# Patient Record
Sex: MALE | Race: BLACK OR AFRICAN AMERICAN | NOT HISPANIC OR LATINO | Employment: FULL TIME | URBAN - METROPOLITAN AREA
[De-identification: names, ages, dates, MRNs, and addresses within clinical notes are randomized per-mention and may not be internally consistent; named-entity substitution may affect disease eponyms.]

---

## 2018-09-14 ENCOUNTER — CLINICAL SUPPORT (OUTPATIENT)
Dept: URGENT CARE | Facility: CLINIC | Age: 53
End: 2018-09-14

## 2018-09-14 DIAGNOSIS — Z02.1 PRE-EMPLOYMENT DRUG SCREENING: Primary | ICD-10-CM

## 2018-09-14 LAB
CTP QC/QA: YES
POC 10 PANEL DRUG SCREEN: NEGATIVE

## 2018-09-14 PROCEDURE — 80305 DRUG TEST PRSMV DIR OPT OBS: CPT | Mod: QW,S$GLB,, | Performed by: NURSE PRACTITIONER

## 2018-09-14 NOTE — PROGRESS NOTES
Subjective:       Patient ID: Tigre Garcia is a 52 y.o. male.    Vitals:  vitals were not taken for this visit.     Chief Complaint: No chief complaint on file.    Patient is here for pre employment drug testing       ROS    Objective:      Physical Exam    Assessment:       No diagnosis found.    Plan:         There are no diagnoses linked to this encounter.

## 2019-02-02 ENCOUNTER — HOSPITAL ENCOUNTER (INPATIENT)
Facility: HOSPITAL | Age: 54
LOS: 1 days | Discharge: HOME | DRG: 316 | End: 2019-02-03
Attending: INTERNAL MEDICINE | Admitting: HOSPITALIST

## 2019-02-02 DIAGNOSIS — I31.39 PERICARDIAL EFFUSION: ICD-10-CM

## 2019-02-02 DIAGNOSIS — I30.9 ACUTE PERICARDITIS, UNSPECIFIED TYPE: ICD-10-CM

## 2019-02-02 DIAGNOSIS — R79.89 ELEVATED D-DIMER: ICD-10-CM

## 2019-02-02 DIAGNOSIS — I31.9 PERICARDITIS: ICD-10-CM

## 2019-02-02 LAB
CRP SERPL-MCNC: 234.1 MG/L
ERYTHROCYTE [SEDIMENTATION RATE] IN BLOOD BY WESTERGREN METHOD: 120 MM/HR
TROPONIN I SERPL DL<=0.01 NG/ML-MCNC: 0.01 NG/ML

## 2019-02-02 PROCEDURE — 85652 RBC SED RATE AUTOMATED: CPT

## 2019-02-02 PROCEDURE — 84484 ASSAY OF TROPONIN QUANT: CPT | Mod: 91

## 2019-02-02 PROCEDURE — 93005 ELECTROCARDIOGRAM TRACING: CPT

## 2019-02-02 PROCEDURE — 99223 1ST HOSP IP/OBS HIGH 75: CPT | Mod: ,,, | Performed by: HOSPITALIST

## 2019-02-02 PROCEDURE — 20600001 HC STEP DOWN PRIVATE ROOM

## 2019-02-02 PROCEDURE — 36415 COLL VENOUS BLD VENIPUNCTURE: CPT

## 2019-02-02 PROCEDURE — 86140 C-REACTIVE PROTEIN: CPT

## 2019-02-02 PROCEDURE — 93010 EKG 12-LEAD: ICD-10-PCS | Mod: ,,, | Performed by: INTERNAL MEDICINE

## 2019-02-02 PROCEDURE — 93010 ELECTROCARDIOGRAM REPORT: CPT | Mod: ,,, | Performed by: INTERNAL MEDICINE

## 2019-02-02 PROCEDURE — 99223 PR INITIAL HOSPITAL CARE,LEVL III: ICD-10-PCS | Mod: ,,, | Performed by: HOSPITALIST

## 2019-02-02 PROCEDURE — 25000003 PHARM REV CODE 250: Performed by: HOSPITALIST

## 2019-02-02 RX ORDER — SODIUM CHLORIDE 0.9 % (FLUSH) 0.9 %
5 SYRINGE (ML) INJECTION
Status: DISCONTINUED | OUTPATIENT
Start: 2019-02-02 | End: 2019-02-03 | Stop reason: HOSPADM

## 2019-02-02 RX ORDER — ACETAMINOPHEN 325 MG/1
650 TABLET ORAL EVERY 8 HOURS PRN
Status: DISCONTINUED | OUTPATIENT
Start: 2019-02-02 | End: 2019-02-03 | Stop reason: HOSPADM

## 2019-02-02 RX ORDER — PANTOPRAZOLE SODIUM 40 MG/1
40 TABLET, DELAYED RELEASE ORAL DAILY
Status: DISCONTINUED | OUTPATIENT
Start: 2019-02-03 | End: 2019-02-03 | Stop reason: HOSPADM

## 2019-02-02 RX ORDER — IBUPROFEN 400 MG/1
800 TABLET ORAL 3 TIMES DAILY
Status: DISCONTINUED | OUTPATIENT
Start: 2019-02-02 | End: 2019-02-03 | Stop reason: HOSPADM

## 2019-02-02 RX ORDER — IBUPROFEN 200 MG
24 TABLET ORAL
Status: DISCONTINUED | OUTPATIENT
Start: 2019-02-02 | End: 2019-02-03

## 2019-02-02 RX ORDER — IBUPROFEN 200 MG
16 TABLET ORAL
Status: DISCONTINUED | OUTPATIENT
Start: 2019-02-02 | End: 2019-02-03

## 2019-02-02 RX ORDER — RAMELTEON 8 MG/1
8 TABLET ORAL NIGHTLY PRN
Status: DISCONTINUED | OUTPATIENT
Start: 2019-02-02 | End: 2019-02-03 | Stop reason: HOSPADM

## 2019-02-02 RX ORDER — GLUCAGON 1 MG
1 KIT INJECTION
Status: DISCONTINUED | OUTPATIENT
Start: 2019-02-02 | End: 2019-02-03

## 2019-02-02 RX ORDER — ONDANSETRON 8 MG/1
8 TABLET, ORALLY DISINTEGRATING ORAL EVERY 8 HOURS PRN
Status: DISCONTINUED | OUTPATIENT
Start: 2019-02-02 | End: 2019-02-03 | Stop reason: HOSPADM

## 2019-02-02 RX ORDER — AMOXICILLIN 250 MG
1 CAPSULE ORAL 2 TIMES DAILY PRN
Status: DISCONTINUED | OUTPATIENT
Start: 2019-02-02 | End: 2019-02-03 | Stop reason: HOSPADM

## 2019-02-02 RX ORDER — COLCHICINE 0.6 MG/1
0.6 TABLET, FILM COATED ORAL DAILY
Status: DISCONTINUED | OUTPATIENT
Start: 2019-02-02 | End: 2019-02-03 | Stop reason: HOSPADM

## 2019-02-02 RX ADMIN — COLCHICINE 0.6 MG: 0.6 TABLET, FILM COATED ORAL at 09:02

## 2019-02-02 RX ADMIN — IBUPROFEN 800 MG: 400 TABLET, FILM COATED ORAL at 09:02

## 2019-02-02 NOTE — PLAN OF CARE
"Upon the patient's arrival to the floor, please call extension [phone] STAT for Hospital Medicine admit team assignment and for additional admit orders (if nobody answers, press the number 1 option, and this will flip to a beeper or spectra-link, so put in your call back number).  Do not page the attending staff physician associated with the patient in EPIC (they may not be in-house at the time of arrival).  Rather, always call [phone] to reach the triage physician for orders and team assignment.  If you have received no call back after 20 minutes, try again.  If you have not reached Hospital Medicine triage coordinator after two tries, please escalate to charge nurse in order to page the doctor on call for Hospital Medicine in general.         Outside Transfer Acceptance Note     Patients name:  Emanuel Garcia, MRN [id number]      Transferring Physician or Mid-Level provider/Clinic giving report:   Dr. Jose Giang at [facility] ED    Accepting Physician for admission to hospital: Prasanna Mckeon M.D.     Consulting Physicians: Dr. Pate, Cardiology    Acceptance date:  02/02/2019    Reason for transfer:   Acute pericarditis with pericardial effusion    Report from Physician/Mid-Level Provider:    Chief Complaint   Patient presents with    Chest Pain       C/o midsternal chest pain, nonradiating x 2 weeeks. Intermittent in nature. + cough as well. Denies SOB. Symptoms somewhat better with OTC theraflu and cold/flu medications.     "This patient is a 53-year-old male.  Presents to the emergency department complaining of chest pain.  Has been persistent for the last 2 weeks.  The pain is in the retrosternal region, but he said it only hurts when he lies down flat, or when he lies on his right side or left side while he is in bed.  As soon as he gets up, the pain goes away.  Does not have pain when he exerts himself for walks.  No shortness of breath or palpitations.  He has had some chills and sweats, " "and said that he feels like he has had URI symptoms for the last week.  He had congestion, cough, productive of clear sputum.  No hemoptysis.  Has been taking over-the-counter TheraFlu without relief.  No nausea vomiting or diarrhea.  No rash. No history of heart disease."    Per sending MD, he appears very stable currently with good BP and P.  His Echo did show some RV collapse with diastole, so will need to monitor closely for tamponade signs and symptoms, though currently he has no JVD or hypotension.      To Do List upon arrival:    Stat cardiology consult with bedside echo  Case discussed with Dr. Pate  Got Solumedrol 125mg iv in ED and Toradol iv  Tele, CCU consult with any signs of tamponade.    CLINICAL REVIEW:  Past Medical History:  None    Review of patient's allergies indicates:  No Known Allergies    Vital Signs:  Temp:  [99.2 °F (37.3 °C)] 99.2 °F (37.3 °C)  Pulse:  [85-99] 99  Resp:  [15-27] 27  SpO2:  [96 %] 96 %  BP: (132-143)/(80-90) 140/88    LABS:  Recent Results (from the past 24 hour(s))   CBC auto differential    Collection Time: 02/02/19 10:05 AM   Result Value Ref Range    WBC 8.40 3.90 - 12.70 K/uL    RBC 3.62 (L) 4.60 - 6.20 M/uL    Hemoglobin 12.0 (L) 14.0 - 18.0 g/dL    Hematocrit 36.1 (L) 40.0 - 54.0 %     (H) 82 - 98 fL    MCH 33.2 (H) 27.0 - 31.0 pg    MCHC 33.4 32.0 - 36.0 g/dL    RDW 12.1 11.5 - 14.5 %    Platelets 357 (H) 150 - 350 K/uL    MPV 8.0 (L) 9.2 - 12.9 fL    Gran # (ANC) 6.3 1.8 - 7.7 K/uL    Lymph # 1.4 1.0 - 4.8 K/uL    Mono # 0.6 0.3 - 1.0 K/uL    Eos # 0.1 0.0 - 0.5 K/uL    Baso # 0.10 0.00 - 0.20 K/uL    Gran% 74.8 (H) 38.0 - 73.0 %    Lymph% 16.1 (L) 18.0 - 48.0 %    Mono% 7.7 4.0 - 15.0 %    Eosinophil% 0.7 0.0 - 8.0 %    Basophil% 0.7 0.0 - 1.9 %    Differential Method Automated    Comprehensive metabolic panel    Collection Time: 02/02/19 10:05 AM   Result Value Ref Range    Sodium 135 (L) 136 - 145 mmol/L    Potassium 4.0 3.5 - 5.1 mmol/L    Chloride " 103 101 - 111 mmol/L    CO2 25 23 - 29 mmol/L    Glucose 93 74 - 118 mg/dL    BUN, Bld 11 6 - 20 mg/dL    Creatinine 0.9 0.5 - 1.4 mg/dL    Calcium 8.9 8.6 - 10.0 mg/dL    Total Protein 8.5 (H) 6.0 - 8.4 g/dL    Albumin 3.2 (L) 3.5 - 5.2 g/dL    Total Bilirubin 1.1 0.3 - 1.2 mg/dL    Alkaline Phosphatase 61 38 - 126 U/L    AST 19 15 - 41 U/L    ALT 12 (L) 17 - 63 U/L    Anion Gap 7 (L) 8 - 16 mmol/L    eGFR if African American >60.0 >60 mL/min/1.73 m^2    eGFR if non African American >60.0 >60 mL/min/1.73 m^2   Phosphorus    Collection Time: 02/02/19 10:05 AM   Result Value Ref Range    Phosphorus 3.1 2.4 - 4.6 mg/dL   Magnesium    Collection Time: 02/02/19 10:05 AM   Result Value Ref Range    Magnesium 2.1 1.6 - 2.6 mg/dL   Brain natriuretic peptide    Collection Time: 02/02/19 10:05 AM   Result Value Ref Range    BNP 31 0 - 99 pg/mL   Troponin I    Collection Time: 02/02/19 10:05 AM   Result Value Ref Range    Troponin I <0.01 0.01 - 0.05 ng/mL   D dimer, quantitative    Collection Time: 02/02/19 10:05 AM   Result Value Ref Range    D-Dimer 2.85 (H) <0.50 mg/L FEU   Influenza antigen Nasopharyngeal Swab    Collection Time: 02/02/19 10:11 AM   Result Value Ref Range    Influenza A Ag, EIA Negative Negative    Influenza B Ag, EIA Negative Negative    Flu A & B Source Nasopharyngeal Swab    Transthoracic echo (TTE) complete (Cupid Only)    Collection Time: 02/02/19  2:09 PM   Result Value Ref Range    BSA 1.97 m2    AORTIC VALVE CUSP SEPERATION 2.50 cm    PV PEAK VELOCITY 1.05 cm/s    LVIDD 3.42 (A) 3.5 - 6.0 cm    IVS 1.36 (A) 0.6 - 1.1 cm    PW 1.13 (A) 0.6 - 1.1 cm    Ao root annulus 3.40 cm    LVIDS 1.94 (A) 2.1 - 4.0 cm    FS 43 28 - 44 %    LV mass 139.06 g    LA size 2.77 cm    RVDD 2.71 cm    Left Ventricle Relative Wall Thickness 0.66 cm    E/A ratio 0.76     E wave decelartion time 140.93 msec    LVOT diameter 2.16 cm    LVOT area 3.66 cm2    Ao peak karlee 1.54 m/s    AV peak gradient 9.49 mmHg    MV Peak E  Maninder 0.64 m/s    TR Max Maninder 2.38 m/s    MV Peak A Maninder 0.84 m/s    LV Systolic Volume 11.77 mL    LV Systolic Volume Index 6.0 mL/m2    LV Diastolic Volume 48.03 mL    LV Diastolic Volume Index 24.40 mL/m2    LV Mass Index 70.7 g/m2    Triscuspid Valve Regurgitation Peak Gradient 22.66 mmHg       Radiographs:   XR CHEST 1 VIEW    CLINICAL HISTORY:  Chest Pain;    TECHNIQUE:  Single frontal view of the chest was performed.    COMPARISON:  None    FINDINGS:  Cardiac monitor lead apparatus overlies the chest.  Even allowing for magnification by projection, the heart appears of enlarged size.  No pulmonary vascular engorgement or congestion.  No evidence of hilar or mediastinal mass.  There is nonspecific mild left lung base linear fibrosis and/or discoid atelectasis with mild blunting of the left lateral costophrenic angle of uncertain age.  The lungs are otherwise generally well aerated and clear.      Impression       1. Relatively mild cardiomegaly, exaggerated by projection.  2. Nonspecific mild linear fibrosis and/or discoid atelectasis at the left base with blunting of the left lateral costophrenic angle of uncertain age.  3. Recommend clinical correlation with consideration for interval follow-up radiographic examination.  Prior studies for comparison?      Electronically signed by: Azar Crespo IV, MD  Date: 02/02/2019  Time: 11:03     EKG: normal sinus rhythm, there is 1 mm of ST-elevation in lead 2 only.  There is left atrial enlargement.       Medications:  No current facility-administered medications for this encounter.   No current outpatient medications on file.

## 2019-02-03 VITALS
SYSTOLIC BLOOD PRESSURE: 116 MMHG | WEIGHT: 171 LBS | RESPIRATION RATE: 18 BRPM | OXYGEN SATURATION: 90 % | HEART RATE: 82 BPM | DIASTOLIC BLOOD PRESSURE: 75 MMHG | BODY MASS INDEX: 23.85 KG/M2 | TEMPERATURE: 98 F

## 2019-02-03 PROBLEM — R79.89 ELEVATED D-DIMER: Status: RESOLVED | Noted: 2019-02-02 | Resolved: 2019-02-03

## 2019-02-03 LAB
ASCENDING AORTA: 2.94 CM
AV INDEX (PROSTH): 0.99
AV MEAN GRADIENT: 4.21 MMHG
AV PEAK GRADIENT: 6.76 MMHG
AV VALVE AREA: 4.35 CM2
AV VELOCITY RATIO: 0.99
BSA FOR ECHO PROCEDURE: 1.97 M2
CV ECHO LV RWT: 0.35 CM
DOP CALC AO PEAK VEL: 1.3 M/S
DOP CALC AO VTI: 23.33 CM
DOP CALC LVOT AREA: 4.37 CM2
DOP CALC LVOT DIAMETER: 2.36 CM
DOP CALC LVOT PEAK VEL: 1.28 M/S
DOP CALC LVOT STROKE VOLUME: 101.39 CM3
DOP CALCLVOT PEAK VEL VTI: 23.19 CM
E WAVE DECELERATION TIME: 232.44 MSEC
E/A RATIO: 1.13
E/E' RATIO: 9
ECHO LV POSTERIOR WALL: 0.8 CM (ref 0.6–1.1)
FRACTIONAL SHORTENING: 45 % (ref 28–44)
INTERVENTRICULAR SEPTUM: 0.86 CM (ref 0.6–1.1)
IVRT: 0.1 MSEC
LA MAJOR: 5.8 CM
LA MINOR: 5.79 CM
LA WIDTH: 4.1 CM
LEFT ATRIUM SIZE: 3.07 CM
LEFT ATRIUM VOLUME INDEX: 31.4 ML/M2
LEFT ATRIUM VOLUME: 62 CM3
LEFT INTERNAL DIMENSION IN SYSTOLE: 2.51 CM (ref 2.1–4)
LEFT VENTRICLE DIASTOLIC VOLUME INDEX: 49.25 ML/M2
LEFT VENTRICLE DIASTOLIC VOLUME: 97.18 ML
LEFT VENTRICLE MASS INDEX: 62.7 G/M2
LEFT VENTRICLE SYSTOLIC VOLUME INDEX: 11.4 ML/M2
LEFT VENTRICLE SYSTOLIC VOLUME: 22.52 ML
LEFT VENTRICULAR INTERNAL DIMENSION IN DIASTOLE: 4.6 CM (ref 3.5–6)
LEFT VENTRICULAR MASS: 123.72 G
LV LATERAL E/E' RATIO: 9
LV SEPTAL E/E' RATIO: 9
MV PEAK A VEL: 0.64 M/S
MV PEAK E VEL: 0.72 M/S
PISA TR MAX VEL: 2.03 M/S
PULM VEIN S/D RATIO: 1.17
PV PEAK D VEL: 0.41 M/S
PV PEAK S VEL: 0.48 M/S
RA MAJOR: 4.98 CM
RA PRESSURE: 3 MMHG
RA WIDTH: 3.49 CM
RIGHT VENTRICULAR END-DIASTOLIC DIMENSION: 3.39 CM
RV TISSUE DOPPLER FREE WALL SYSTOLIC VELOCITY 1 (APICAL 4 CHAMBER VIEW): 11.98 M/S
SINUS: 3.43 CM
STJ: 2.96 CM
TDI LATERAL: 0.08
TDI SEPTAL: 0.08
TDI: 0.08
TR MAX PG: 16.48 MMHG
TRICUSPID ANNULAR PLANE SYSTOLIC EXCURSION: 2 CM
TV REST PULMONARY ARTERY PRESSURE: 19 MMHG

## 2019-02-03 PROCEDURE — 25000003 PHARM REV CODE 250: Performed by: HOSPITALIST

## 2019-02-03 PROCEDURE — 99254 PR INITIAL INPATIENT CONSULT,LEVL IV: ICD-10-PCS | Mod: ,,, | Performed by: INTERNAL MEDICINE

## 2019-02-03 PROCEDURE — 99239 HOSP IP/OBS DSCHRG MGMT >30: CPT | Mod: ,,, | Performed by: NURSE PRACTITIONER

## 2019-02-03 PROCEDURE — 99254 IP/OBS CNSLTJ NEW/EST MOD 60: CPT | Mod: ,,, | Performed by: INTERNAL MEDICINE

## 2019-02-03 PROCEDURE — 99239 PR HOSPITAL DISCHARGE DAY,>30 MIN: ICD-10-PCS | Mod: ,,, | Performed by: NURSE PRACTITIONER

## 2019-02-03 RX ORDER — COLCHICINE 0.6 MG/1
0.6 TABLET ORAL 2 TIMES DAILY
Qty: 60 TABLET | Refills: 2 | Status: SHIPPED | OUTPATIENT
Start: 2019-02-03 | End: 2019-05-04

## 2019-02-03 RX ORDER — PANTOPRAZOLE SODIUM 40 MG/1
40 TABLET, DELAYED RELEASE ORAL DAILY
Qty: 30 TABLET | Refills: 11 | Status: SHIPPED | OUTPATIENT
Start: 2019-02-04 | End: 2020-02-04

## 2019-02-03 RX ORDER — IBUPROFEN 200 MG
TABLET ORAL
Qty: 117 TABLET | Refills: 0 | Status: SHIPPED | OUTPATIENT
Start: 2019-02-03 | End: 2019-02-19

## 2019-02-03 RX ADMIN — IBUPROFEN 800 MG: 400 TABLET, FILM COATED ORAL at 08:02

## 2019-02-03 RX ADMIN — PANTOPRAZOLE SODIUM 40 MG: 40 TABLET, DELAYED RELEASE ORAL at 08:02

## 2019-02-03 RX ADMIN — COLCHICINE 0.6 MG: 0.6 TABLET, FILM COATED ORAL at 08:02

## 2019-02-03 NOTE — ASSESSMENT & PLAN NOTE
- Likely from viral URI recently  - Noted elevated ESR and CRP.  - Agree with NSAIDs and Colchicine.  - Full TTE in AM

## 2019-02-03 NOTE — NURSING TRANSFER
Nursing Transfer Note      2/2/2019     Transfer fro [facility]    Transfer via ambulance    Transfer with escort    Transported by paramedic stretcher    Medicines sent: no    Chart send with patient: YES    Notified: family aware    Patient reassessed at: 1850pm  Upon arrival to floor:oriented to room call light and staff.

## 2019-02-03 NOTE — SUBJECTIVE & OBJECTIVE
History reviewed. No pertinent past medical history.    History reviewed. No pertinent surgical history.    Review of patient's allergies indicates:  No Known Allergies    Current Facility-Administered Medications on File Prior to Encounter   Medication    [COMPLETED] acetaminophen tablet 650 mg    [COMPLETED] colchicine capsule/tablet 1.2 mg    [COMPLETED] ketorolac injection 30 mg    [COMPLETED] lactated ringers infusion    [COMPLETED] methylPREDNISolone sodium succinate injection 125 mg    [COMPLETED] omnipaque 350 iohexol 150 mL     No current outpatient medications on file prior to encounter.     Family History     None        Tobacco Use    Smoking status: Not on file   Substance and Sexual Activity    Alcohol use: Not on file    Drug use: Not on file    Sexual activity: Not on file     Review of Systems   Constitution: Negative for chills, decreased appetite and diaphoresis.   HENT: Negative for congestion and ear discharge.    Eyes: Negative for blurred vision and discharge.   Cardiovascular: Positive for chest pain. Negative for dyspnea on exertion, irregular heartbeat, leg swelling and paroxysmal nocturnal dyspnea.   Respiratory: Negative for cough, hemoptysis and shortness of breath.    Gastrointestinal: Negative for abdominal pain.     Objective:     Vital Signs (Most Recent):  Temp: 97.9 °F (36.6 °C) (02/02/19 2342)  Pulse: 69 (02/03/19 0300)  Resp: 14 (02/02/19 2342)  BP: 127/82 (02/02/19 2342)  SpO2: (!) 93 % (02/02/19 2342) Vital Signs (24h Range):  Temp:  [97.9 °F (36.6 °C)-99.2 °F (37.3 °C)] 97.9 °F (36.6 °C)  Pulse:  [69-99] 69  Resp:  [14-27] 14  SpO2:  [90 %-96 %] 93 %  BP: (113-143)/(80-91) 127/82     Weight: 77.7 kg (171 lb 4.8 oz)  Body mass index is 23.89 kg/m².    SpO2: (!) 93 %  O2 Device (Oxygen Therapy): room air      Intake/Output Summary (Last 24 hours) at 2/3/2019 3802  Last data filed at 2/2/2019 2300  Gross per 24 hour   Intake 120 ml   Output 0 ml   Net 120 ml        Lines/Drains/Airways     Peripheral Intravenous Line                 Peripheral IV - Single Lumen 02/02/19 1008 Right Forearm less than 1 day                Physical Exam   Constitutional: He is oriented to person, place, and time. He appears well-developed and well-nourished. No distress.   Eyes: Conjunctivae are normal. Pupils are equal, round, and reactive to light.   Neck: No tracheal deviation present. No thyromegaly present.   Cardiovascular: Normal rate, regular rhythm, normal heart sounds and intact distal pulses. Exam reveals no gallop and no friction rub.   No murmur heard.  Pulses:       Radial pulses are 2+ on the right side, and 2+ on the left side.        Femoral pulses are 2+ on the right side, and 2+ on the left side.  Pulmonary/Chest: Effort normal and breath sounds normal. No respiratory distress. He has no wheezes. He has no rales.   Abdominal: Soft. Bowel sounds are normal. He exhibits no distension. There is no tenderness.   Musculoskeletal: He exhibits no edema or deformity.   Neurological: He is alert and oriented to person, place, and time. No cranial nerve deficit. Coordination normal.   Skin: Skin is warm and dry. He is not diaphoretic.   Psychiatric: He has a normal mood and affect. His behavior is normal.       Significant Labs:   CMP   Recent Labs   Lab 02/02/19  1005   *   K 4.0      CO2 25   GLU 93   BUN 11   CREATININE 0.9   CALCIUM 8.9   PROT 8.5*   ALBUMIN 3.2*   BILITOT 1.1   ALKPHOS 61   AST 19   ALT 12*   ANIONGAP 7*   ESTGFRAFRICA >60.0   EGFRNONAA >60.0    and CBC   Recent Labs   Lab 02/02/19  1005   WBC 8.40   HGB 12.0*   HCT 36.1*   *       Significant Imaging: Echocardiogram: 2D echo with color flow doppler: No results found for this or any previous visit.

## 2019-02-03 NOTE — H&P
"Hospital Medicine  History and Physical      Patient Name: Tigre Garcia  MRN:  [id number]  Hospital Medicine Team: [facility] Delores Proctor MD  Date of Admission:  2/2/2019     Principal Problem:  Pericarditis   Primary Care Physician: Primary Doctor No       History of Present Illness:    Mr. Tigre Garcia is a 53 y.o. male who [place] to [facility] [place] from [facility] for Cardiology evaluation for acute pericarditis and pericardial effusion.  He mentions that for the last 2 weeks, he has been having retrosternal chest pain that he describes as a soreness.  This pain is worse when lying down flat, and relieved when leaning forward or on his side.  He denies any shortness of breath or palpitations.  He has felt like he had a URI the last week, and has been taking some TheraFlu without relief.    Per. Dr. Jose Giang in the [facility] ED:  "He appears very stable currently with good BP and P.  His Echo did show some RV collapse with diastole, so will need to monitor closely for tamponade signs and symptoms, though currently he has no JVD or hypotension."  He was given Solumedrol 125mg IV with IV Toradol.  His case with discussed with Dr. Pate of [facility] - who suggested transfer with admission to Hospital Medicine, Cardiology consult, and 2D echo.  Upon arrival, Mr. Garcia is doing well and is have no chest pain or SOB.  Vitals were notable for /91, HR 99.  Cardiology was consulted for bedside echo.      Review of Systems:  Constitutional: Negative for chills, fatigue, fever.   HENT: Negative for sore throat, trouble swallowing.    Eyes: Negative for photophobia, visual disturbance.   Respiratory: Negative for cough, shortness of breath.    Cardiovascular: + chest pain   Gastrointestinal: Negative for abdominal pain, constipation, diarrhea, nausea, vomiting.   Endocrine: Negative for cold intolerance, heat intolerance.   Genitourinary: Negative for dysuria, frequency. "   Musculoskeletal: Negative for arthralgias, myalgias.   Skin: Negative for rash, wound, erythema   Neurological: Negative for dizziness, syncope, weakness, light-headedness.   Psychiatric/Behavioral: Negative for confusion, hallucinations, anxiety  All other systems reviewed and are negative.      Past Medical History: Patient has no past medical history on file.    Past Surgical History: Patient has no past surgical history on file.    Social History: Patient     Family History: Patient's family history is not on file.    Medications: Scheduled Meds:   colchicine  0.6 mg Oral Daily    ibuprofen  800 mg Oral TID    [START ON 2/3/2019] pantoprazole  40 mg Oral Daily     Continuous Infusions:  PRN Meds:.acetaminophen, dextrose 50%, dextrose 50%, glucagon (human recombinant), glucose, glucose, ondansetron, ramelteon, senna-docusate 8.6-50 mg, sodium chloride 0.9%    Allergies: Patient has No Known Allergies.      Physical Exam:    Temp:  [99.2 °F (37.3 °C)]   Pulse:  [85-99]   Resp:  [15-27]   BP: (113-143)/(80-91)   SpO2:  [90 %-96 %]     Constitutional: Appears well-developed and well-nourished.   Head: Normocephalic and atraumatic.   Mouth/Throat: Oropharynx is clear and moist.   Eyes: EOM are normal. Pupils are equal, round, and reactive to light. No scleral icterus.   Neck: Normal range of motion. Neck supple.   Cardiovascular: Normal heart rate.  Regular heart rhythm.  No murmur heard.  No friction rub.  Pulmonary/Chest: Effort normal and breath sounds normal. No respiratory distress. No wheezes, rales, or rhonchi  Abdominal: Soft. Bowel sounds are normal.  No distension.  No tenderness  Musculoskeletal: Normal range of motion. No edema.   Neurological: Alert and oriented to person, place, and time.   Skin: Skin is warm and dry. Multiple tattoos  Psychiatric: Normal mood and affect. Behavior is normal.       No intake or output data in the 24 hours ending 02/02/19 2050  Recent Labs   Lab 02/02/19  1005   WBC  8.40   HGB 12.0*   HCT 36.1*   *     Recent Labs   Lab 02/02/19  1005   *   K 4.0      CO2 25   BUN 11   CREATININE 0.9   GLU 93   CALCIUM 8.9   MG 2.1   PHOS 3.1     Recent Labs   Lab 02/02/19  1005   ALKPHOS 61   ALT 12*   AST 19   ALBUMIN 3.2*   PROT 8.5*   BILITOT 1.1      No results for input(s): POCTGLUCOSE in the last 168 hours.  Recent Labs     02/02/19  1005   TROPONINI <0.01       No results for input(s): LACTATE in the last 72 hours.       Assessment and Plan:    Mr. Tigre Garcia is a 53 y.o. male who presented to [facility] on 2/2/2019 with acute pericarditis.      Acute Pericarditis  Pericardial Effusion  · Initial EKG at [place] with no ST elevation and CT depression, but elevated ESR at 127 and normal trop.  2D echo with some RV collapse with diastole, but no JVD or hypotension  · Repeat EKG at [facility] with ST elevations and EKG read showing acute STEMI - but patient chest pain free - likely 2/2 pericarditis.  Repeat troponin pending. Will trend  · Place on tele  · Cards consulted, appreciate assistance.  Will make NPO at midnight in case of Cards intervention  · Repeat 2D echo  · Start Colchicine 0.6mg PO daily and Ibuprofen 800mg PO TID  · Start PPI to prevent ulcer formation with NSAIDS    Elevated D Dimer  · D Dimer 2.85  · CTA from [place] pending but currently not having any chest pain or SOB       Diet:  Cardiac  GI PPx:  Not indiated  DVT PPx:  Holding in case of pericardiocentesis or window  Goals of Care:  Full    High Risk Conditions:   Patient has a condition that poses threat to life and bodily function: Pericarditis       Disposition: Pending Cards recs   Discharge Needs:  TBD      Delores Proctor MD  [facility] Medicine  Cell:  [phone]  Spectra:  [phone]  Pager:  [phone]

## 2019-02-03 NOTE — ASSESSMENT & PLAN NOTE
- Moderate pericardial effusion not hemodynamically significant with no MV inflow variation and no diastolic collapse, no need for drain at this time.

## 2019-02-03 NOTE — NURSING
Pt discharged per MD orders.  Tele and IV discontinued.  Catheter tip intact x.  Medication list and prescriptions reviewed; prescriptions sent to pt preferred pharmacy.  Pt verbalizes understanding of all written and verbal discharge instructions.  MIS performed and documented in chart. Pt awaiting family/escort arrival.  Will continue to monitor.

## 2019-02-03 NOTE — HPI
"Mr. Tigre Garcia is a 53 year old male with no past medical history who presents as a transfer from [facility] for Cardiology evaluation for acute pericarditis and pericardial effusion.  He mentions that for the last 2 weeks, he has been having retrosternal chest pain that he describes as a soreness.  This pain is worse when lying down flat, and relieved when leaning forward or on his side.  He denies any shortness of breath or palpitations.  He has felt like he had a URI the last week, and has been taking some TheraFlu without relief.     Per. Dr. Jose Giang in the [facility] ED:  "He appears very stable currently with good BP and P.  His Echo did show some RV collapse with diastole, so will need to monitor closely for tamponade signs and symptoms, though currently he has no JVD or hypotension."  He was given Solumedrol 125mg IV with IV Toradol.  His case with discussed with Dr. Pate of [facility] - who suggested transfer with admission to Hospital Medicine, Cardiology consult, and 2D echo.  Upon arrival, Mr. Garcia is doing well and is have no chest pain or SOB.  Vitals were notable for /91, HR 99.  Cardiology was consulted for bedside echo.      The patient was admitted to the Hospital Medicine Service for further evaluation and management.       "

## 2019-02-03 NOTE — ASSESSMENT & PLAN NOTE
-on admission CBC and chemistry stable, , , D-dimer 2.85, troponin negative, BNP WNL, flu negative  -admitted 2/2 due to pericarditis with likely viral etiology  -TTE completed with normal EF, normal LV diastolic function, no WMAs, nomral RV systolic function, CVP 3, and moderate circumferential pericardial effusion  -upon arrival he was HDS >> remained so  -Colchicine and ibuprofen initiated with resolution of symptoms  -Cardiology followed and agreed with plan of care  -will need follow up to ensure resolution and monitoring of mediastinal lymphadenopathy seen on CTA as differential diagnosis includes malignant or infectious pericarditis  -he does not have a PCP, will refer to primary care and establish follow up and notify him via cell phone of appointment  -outpt renal US ordered to follow up on CT results and outpt TTE ordered for ~ 3 months to ensure resolution of effusion with pericarditis treatment

## 2019-02-03 NOTE — DISCHARGE SUMMARY
"[facility] Hospital Medicine  Discharge Summary      Patient Name: Tigre Garcia  MRN: [id number]  Admission Date: 2/2/2019  Hospital Length of Stay: 1 days  Discharge Date and Time: No discharge date for patient encounter.  Attending Physician: Kayode Lee, *   Discharging Provider: Remedios Fung NP  Primary Care Provider: Primary Doctor No  Hospital Medicine Team: [facility]  Remedios Fung NP    HPI:   Mr. Tigre Garcia is a 53 year old male with no past medical history who presents as a transfer from [facility] for Cardiology evaluation for acute pericarditis and pericardial effusion.  He mentions that for the last 2 weeks, he has been having retrosternal chest pain that he describes as a soreness.  This pain is worse when lying down flat, and relieved when leaning forward or on his side.  He denies any shortness of breath or palpitations.  He has felt like he had a URI the last week, and has been taking some TheraFlu without relief.     Per. Dr. Jose Giang in the [facility] ED:  "He appears very stable currently with good BP and P.  His Echo did show some RV collapse with diastole, so will need to monitor closely for tamponade signs and symptoms, though currently he has no JVD or hypotension."  He was given Solumedrol 125mg IV with IV Toradol.  His case with discussed with Dr. Pate of [facility] - who suggested transfer with admission to Hospital Medicine, Cardiology consult, and 2D echo.  Upon arrival, Mr. Garcia is doing well and is have no chest pain or SOB.  Vitals were notable for /91, HR 99.  Cardiology was consulted for bedside echo.      The patient was admitted to the Hospital Medicine Service for further evaluation and management.         * No surgery found *      Hospital Course:   Mr. Garcia was admitted 2/2 due to pericarditis with likely viral etiology. TTE completed with normal EF, normal LV diastolic function, no WMAs, nomral " RV systolic function, CVP 3, and moderate circumferential pericardial effusion. Upon arrival he was HDS. Colchicine and ibuprofen initiated with resolution of symptoms. Cardiology followed and agreed with plan of care, will need follow up to ensure resolution and monitoring of mediastinal lymphadenopathy seen on CTA as differential diagnosis includes malignant or infectious pericarditis.    He does not have a PCP, will refer to primary care and establish follow up, outpt renal US ordered to follow up on CT results, and outpt TTE ordered for ~ 3 months to ensure resolution of effusion with pericarditis treatment.      Consults:   Consults (From admission, onward)        Status Ordering Provider     Inpatient consult to Cardiology  Once     Provider:  (Not yet assigned)    Completed DEBBY MONCADA          * Pericarditis    -on admission CBC and chemistry stable, , , D-dimer 2.85, troponin negative, BNP WNL, flu negative  -admitted 2/2 due to pericarditis with likely viral etiology  -TTE completed with normal EF, normal LV diastolic function, no WMAs, nomral RV systolic function, CVP 3, and moderate circumferential pericardial effusion  -upon arrival he was HDS >> remained so  -Colchicine and ibuprofen initiated with resolution of symptoms  -Cardiology followed and agreed with plan of care  -will need follow up to ensure resolution and monitoring of mediastinal lymphadenopathy seen on CTA as differential diagnosis includes malignant or infectious pericarditis  -he does not have a PCP, will refer to primary care and establish follow up and notify him via cell phone of appointment  -outpt renal US ordered to follow up on CT results and outpt TTE ordered for ~ 3 months to ensure resolution of effusion with pericarditis treatment     Pericardial effusion    -see above     Elevated [id number] as of 2/3/2019    -see above  -negative CTA for PE       Final Active Diagnoses:    Diagnosis Date Noted POA     PRINCIPAL PROBLEM:  Pericarditis [I31.9] 02/02/2019 Yes    Pericardial effusion [I31.3] 02/02/2019 Yes      Problems Resolved During this Admission:    Diagnosis Date Noted Date Resolved POA    Elevated d-dimer [R79.89] 02/02/2019 02/03/2019 Yes       Discharged Condition: good    Disposition: Home or Self Care    Follow Up:    Patient Instructions:      US Kidney   Standing Status: Future Standing Exp. Date: 02/03/20     Order Specific Question Answer Comments   May the Radiologist modify the order per protocol to meet the clinical needs of the patient? Yes      Diet Adult Regular     Notify your health care provider if you experience any of the following:  temperature >100.4     Notify your health care provider if you experience any of the following:  persistent nausea and vomiting or diarrhea     Notify your health care provider if you experience any of the following:  severe uncontrolled pain     Notify your health care provider if you experience any of the following:  difficulty breathing or increased cough     Notify your health care provider if you experience any of the following:  severe persistent headache     Notify your health care provider if you experience any of the following:  persistent dizziness, light-headedness, or visual disturbances     Notify your health care provider if you experience any of the following:  increased confusion or weakness     Transthoracic echo (TTE) complete (Cupid Only)   Standing Status: Future Standing Exp. Date: 02/03/20     Activity as tolerated     Review of Systems   Constitutional: Negative for chills, diaphoresis, fever, malaise/fatigue and weight loss.   HENT: Negative for congestion, hearing loss and sinus pain.    Respiratory: Negative for cough, sputum production, shortness of breath, wheezing and stridor.    Cardiovascular: Negative for chest pain, leg swelling and PND.   Gastrointestinal: Negative for diarrhea, heartburn, nausea and vomiting.    Musculoskeletal: Negative for back pain, joint pain, myalgias and neck pain.   Neurological: Negative for dizziness, focal weakness, weakness and headaches.     Physical Exam   Constitutional: He is oriented to person, place, and time. He appears well-developed and well-nourished.   HENT:   Head: Normocephalic and atraumatic.   Mouth/Throat: Mucous membranes are normal. Normal dentition.   Eyes: Conjunctivae and lids are normal. Pupils are equal, round, and reactive to light.   Neck: Normal range of motion. Neck supple.   Cardiovascular: Normal rate, regular rhythm and intact distal pulses. Exam reveals friction rub. Exam reveals no gallop.   No murmur heard.  Pulmonary/Chest: Effort normal and breath sounds normal. He exhibits no tenderness.   Abdominal: Soft. Bowel sounds are normal. He exhibits no distension. There is no tenderness.   Musculoskeletal: Normal range of motion. He exhibits no edema or deformity.   Neurological: He is alert and oriented to person, place, and time. No cranial nerve deficit.   Skin: Skin is warm and dry. Capillary refill takes less than 2 seconds. No rash noted. No erythema.   Psychiatric: He has a normal mood and affect. Judgment and thought content normal.   Nursing note and vitals reviewed.    Significant Diagnostic Studies: Labs:   CMP   Recent Labs   Lab 02/02/19  1005   *   K 4.0      CO2 25   GLU 93   BUN 11   CREATININE 0.9   CALCIUM 8.9   PROT 8.5*   ALBUMIN 3.2*   BILITOT 1.1   ALKPHOS 61   AST 19   ALT 12*   ANIONGAP 7*   ESTGFRAFRICA >60.0   EGFRNONAA >60.0   , CBC   Recent Labs   Lab 02/02/19  1005   WBC 8.40   HGB 12.0*   HCT 36.1*   *   , Troponin   Recent Labs   Lab 02/02/19  2047   TROPONINI 0.010    and All labs within the past 24 hours have been reviewed    Pending Diagnostic Studies:     Procedure Component Value Units Date/Time    Transthoracic echo (TTE) complete (Cupid Only) [[id number]]     Order Status:  Sent Lab Status:  No result           Medications:  Reconciled Home Medications:      Medication List      START taking these medications    colchicine 0.6 mg tablet  Commonly known as:  COLCRYS  Take 1 tablet (0.6 mg total) by mouth 2 (two) times daily.     ibuprofen 200 MG tablet  Commonly known as:  ADVIL,MOTRIN  Take 3 tablets (600 mg total) by mouth 3 (three) times daily for 10 days, THEN 2 tablets (400 mg total) 3 (three) times daily for 3 days, THEN 1 tablet (200 mg total) 3 (three) times daily for 3 days.  Start taking on:  2/3/2019     pantoprazole 40 MG tablet  Commonly known as:  PROTONIX  Take 1 tablet (40 mg total) by mouth once daily.  Start taking on:  2/4/2019          Indwelling Lines/Drains at time of discharge:   Lines/Drains/Airways          None        Time spent on the discharge of patient: 40 minutes  Patient was seen and examined on the date of discharge and determined to be suitable for discharge.      Remedios Fung, JEANINE, AG-ACNP, BC  Department of Hospital Medicine  [facility]

## 2019-02-03 NOTE — HPI
Mr. Tigre Garcia is a 53 y.o. male who presents to [facility] as a transfer from [facility] for Cardiology evaluation for acute pericarditis and pericardial effusion.  He mentions that for the last 2 weeks, he has been having retrosternal chest pain that he describes as a soreness.  This pain is worse when lying down flat, and relieved when leaning forward or on his side.  He denies any shortness of breath or palpitations.  He has felt like he had a URI the last week, and has been taking some TheraFlu without relief.     At OSH there was some concern for Echo with  RV collapse with diastole,   He was given Solumedrol 125mg IV with IV Toradol.  His case with discussed with Dr. Pate of [facility] - who suggested transfer with admission to Hospital Medicine, Cardiology consult, and 2D echo.  Upon arrival, Mr. Garcia is doing well and is have no chest pain or SOB.  Vitals were notable for /91, HR 99.  Bedside TTE showed normal EF 60%, normal RV function, no RA or RV diastolic collapse, small IVC, No mitral inflow respiratory variation. Septations in pericardial effusion. Vitals stable.

## 2019-02-03 NOTE — HOSPITAL COURSE
Mr. Garcia was admitted 2/2 due to pericarditis with likely viral etiology. TTE completed with normal EF, normal LV diastolic function, no WMAs, nomral RV systolic function, CVP 3, and moderate circumferential pericardial effusion. Upon arrival he was HDS. Colchicine and ibuprofen initiated with resolution of symptoms. Cardiology followed and agreed with plan of care, will need follow up to ensure resolution and monitoring of mediastinal lymphadenopathy seen on CTA as differential diagnosis includes malignant or infectious pericarditis.    He does not have a PCP, will refer to primary care and establish follow up, outpt renal US ordered to follow up on CT results, and outpt TTE ordered for ~ 3 months to ensure resolution of effusion with pericarditis treatment.

## 2019-02-03 NOTE — CONSULTS
[facility]  Cardiology  Consult Note    Patient Name: Tigre Garcia  MRN: [id number]  Admission Date: 2/2/2019  Hospital Length of Stay: 1 days  Code Status: Full Code   Attending Provider: Kayode Lee, *   Consulting Provider: Brenda March MD  Primary Care Physician: Primary Doctor No  Principal Problem:Pericarditis    Patient information was obtained from patient and ER records.     Inpatient consult to Cardiology  Consult performed by: Brenda March MD  Consult ordered by: Delores Proctor MD        Subjective:     Chief Complaint:  Pericardial Effusion and Pericarditis     HPI:    Mr. Tigre Garcia is a 53 y.o. male who presents to [facility] as a transfer from [facility] for Cardiology evaluation for acute pericarditis and pericardial effusion.  He mentions that for the last 2 weeks, he has been having retrosternal chest pain that he describes as a soreness.  This pain is worse when lying down flat, and relieved when leaning forward or on his side.  He denies any shortness of breath or palpitations.  He has felt like he had a URI the last week, and has been taking some TheraFlu without relief.     At OSH there was some concern for Echo with  RV collapse with diastole,   He was given Solumedrol 125mg IV with IV Toradol.  His case with discussed with Dr. Pate of [facility] - who suggested transfer with admission to Hospital Medicine, Cardiology consult, and 2D echo.  Upon arrival, Mr. Garcia is doing well and is have no chest pain or SOB.  Vitals were notable for /91, HR 99.  Bedside TTE showed normal EF 60%, normal RV function, no RA or RV diastolic collapse, small IVC, No mitral inflow respiratory variation. Septations in pericardial effusion. Vitals stable.        History reviewed. No pertinent past medical history.    History reviewed. No pertinent surgical history.    Review of patient's allergies indicates:  No Known Allergies    Current  Facility-Administered Medications on File Prior to Encounter   Medication    [COMPLETED] acetaminophen tablet 650 mg    [COMPLETED] colchicine capsule/tablet 1.2 mg    [COMPLETED] ketorolac injection 30 mg    [COMPLETED] lactated ringers infusion    [COMPLETED] methylPREDNISolone sodium succinate injection 125 mg    [COMPLETED] omnipaque 350 iohexol 150 mL     No current outpatient medications on file prior to encounter.     Family History     None        Tobacco Use    Smoking status: Not on file   Substance and Sexual Activity    Alcohol use: Not on file    Drug use: Not on file    Sexual activity: Not on file     Review of Systems   Constitution: Negative for chills, decreased appetite and diaphoresis.   HENT: Negative for congestion and ear discharge.    Eyes: Negative for blurred vision and discharge.   Cardiovascular: Positive for chest pain. Negative for dyspnea on exertion, irregular heartbeat, leg swelling and paroxysmal nocturnal dyspnea.   Respiratory: Negative for cough, hemoptysis and shortness of breath.    Gastrointestinal: Negative for abdominal pain.     Objective:     Vital Signs (Most Recent):  Temp: 97.9 °F (36.6 °C) (02/02/19 2342)  Pulse: 69 (02/03/19 0300)  Resp: 14 (02/02/19 2342)  BP: 127/82 (02/02/19 2342)  SpO2: (!) 93 % (02/02/19 2342) Vital Signs (24h Range):  Temp:  [97.9 °F (36.6 °C)-99.2 °F (37.3 °C)] 97.9 °F (36.6 °C)  Pulse:  [69-99] 69  Resp:  [14-27] 14  SpO2:  [90 %-96 %] 93 %  BP: (113-143)/(80-91) 127/82     Weight: 77.7 kg (171 lb 4.8 oz)  Body mass index is 23.89 kg/m².    SpO2: (!) 93 %  O2 Device (Oxygen Therapy): room air      Intake/Output Summary (Last 24 hours) at 2/3/2019 0344  Last data filed at 2/2/2019 2300  Gross per 24 hour   Intake 120 ml   Output 0 ml   Net 120 ml       Lines/Drains/Airways     Peripheral Intravenous Line                 Peripheral IV - Single Lumen 02/02/19 1008 Right Forearm less than 1 day                Physical Exam    Constitutional: He is oriented to person, place, and time. He appears well-developed and well-nourished. No distress.   Eyes: Conjunctivae are normal. Pupils are equal, round, and reactive to light.   Neck: No tracheal deviation present. No thyromegaly present.   Cardiovascular: Normal rate, regular rhythm, normal heart sounds and intact distal pulses. Exam reveals no gallop and no friction rub.   No murmur heard.  Pulses:       Radial pulses are 2+ on the right side, and 2+ on the left side.        Femoral pulses are 2+ on the right side, and 2+ on the left side.  Pulmonary/Chest: Effort normal and breath sounds normal. No respiratory distress. He has no wheezes. He has no rales.   Abdominal: Soft. Bowel sounds are normal. He exhibits no distension. There is no tenderness.   Musculoskeletal: He exhibits no edema or deformity.   Neurological: He is alert and oriented to person, place, and time. No cranial nerve deficit. Coordination normal.   Skin: Skin is warm and dry. He is not diaphoretic.   Psychiatric: He has a normal mood and affect. His behavior is normal.       Significant Labs:   CMP   Recent Labs   Lab 02/02/19  1005   *   K 4.0      CO2 25   GLU 93   BUN 11   CREATININE 0.9   CALCIUM 8.9   PROT 8.5*   ALBUMIN 3.2*   BILITOT 1.1   ALKPHOS 61   AST 19   ALT 12*   ANIONGAP 7*   ESTGFRAFRICA >60.0   EGFRNONAA >60.0    and CBC   Recent Labs   Lab 02/02/19  1005   WBC 8.40   HGB 12.0*   HCT 36.1*   *       Significant Imaging: Echocardiogram: 2D echo with color flow doppler: No results found for this or any previous visit.    Assessment and Plan:     * Pericarditis    - Likely from viral URI recently  - Noted elevated ESR and CRP.  - Agree with NSAIDs and Colchicine.  - Full TTE in AM           Pericardial effusion    - Moderate pericardial effusion not hemodynamically significant with no MV inflow variation and no diastolic collapse, no need for drain at this time.         VTE Risk  Mitigation (From admission, onward)        Ordered     IP VTE LOW RISK PATIENT  Once      02/02/19 2026          Thank you for your consult. I will follow-up with patient. Please contact us if you have any additional questions.    Brenda March MD  Cardiology   [facility]

## 2019-02-04 DIAGNOSIS — I31.9 PERICARDITIS, UNSPECIFIED CHRONICITY, UNSPECIFIED TYPE: Primary | ICD-10-CM

## 2020-12-16 ENCOUNTER — IMMUNIZATION (OUTPATIENT)
Dept: PRIMARY CARE CLINIC | Facility: CLINIC | Age: 55
End: 2020-12-16
Payer: COMMERCIAL

## 2020-12-16 DIAGNOSIS — Z23 NEED FOR VACCINATION: ICD-10-CM

## 2021-01-06 ENCOUNTER — IMMUNIZATION (OUTPATIENT)
Dept: PRIMARY CARE CLINIC | Facility: CLINIC | Age: 56
End: 2021-01-06
Payer: COMMERCIAL

## 2021-01-06 DIAGNOSIS — Z23 NEED FOR VACCINATION: ICD-10-CM

## 2021-01-06 PROCEDURE — 0002A COVID-19, MRNA, LNP-S, PF, 30 MCG/0.3 ML DOSE VACCINE: CPT | Mod: PBBFAC | Performed by: FAMILY MEDICINE

## 2021-01-06 PROCEDURE — 91300 COVID-19, MRNA, LNP-S, PF, 30 MCG/0.3 ML DOSE VACCINE: CPT | Mod: PBBFAC | Performed by: FAMILY MEDICINE

## 2021-10-29 ENCOUNTER — IMMUNIZATION (OUTPATIENT)
Dept: PRIMARY CARE CLINIC | Facility: CLINIC | Age: 56
End: 2021-10-29
Payer: COMMERCIAL

## 2021-10-29 DIAGNOSIS — Z23 NEED FOR VACCINATION: Primary | ICD-10-CM

## 2021-10-29 PROCEDURE — 91300 COVID-19, MRNA, LNP-S, PF, 30 MCG/0.3 ML DOSE VACCINE: CPT | Mod: PBBFAC | Performed by: EMERGENCY MEDICINE

## 2021-10-29 PROCEDURE — 0003A COVID-19, MRNA, LNP-S, PF, 30 MCG/0.3 ML DOSE VACCINE: CPT | Mod: PBBFAC | Performed by: EMERGENCY MEDICINE

## 2021-11-04 ENCOUNTER — TELEPHONE (OUTPATIENT)
Dept: ADMINISTRATIVE | Facility: OTHER | Age: 56
End: 2021-11-04
Payer: COMMERCIAL